# Patient Record
Sex: MALE | Race: WHITE
[De-identification: names, ages, dates, MRNs, and addresses within clinical notes are randomized per-mention and may not be internally consistent; named-entity substitution may affect disease eponyms.]

---

## 2019-07-01 ENCOUNTER — HOSPITAL ENCOUNTER (OUTPATIENT)
Dept: HOSPITAL 25 - OREAST | Age: 24
Discharge: HOME | End: 2019-07-01
Attending: ORTHOPAEDIC SURGERY
Payer: COMMERCIAL

## 2019-07-01 VITALS — DIASTOLIC BLOOD PRESSURE: 73 MMHG | SYSTOLIC BLOOD PRESSURE: 108 MMHG

## 2019-07-01 DIAGNOSIS — M75.41: ICD-10-CM
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
## 2019-07-01 DIAGNOSIS — Z72.0: ICD-10-CM

## 2019-07-01 DIAGNOSIS — S43.431A: Primary | ICD-10-CM
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
## 2019-07-01 DIAGNOSIS — G89.18: ICD-10-CM

## 2019-07-01 PROCEDURE — C1776 JOINT DEVICE (IMPLANTABLE): HCPCS

## 2019-07-01 NOTE — OP
CC:  PCP

 

OPERATIVE REPORT:

 

DATE OF OPERATION:  07/01/19

 

DATE OF BIRTH:  [birth year]

 

SURGEON:  Ryland Shah MD.

 

ASSISTANT:  SAMANTHA Padgett.

 

ANESTHESIOLOGIST:  Dr. Nj.

 

ANESTHESIA:  General interscalene block.

 

PREOPERATIVE DIAGNOSIS:  Right shoulder superior labral tear from anterior to posterior with paralabr
al cyst and subacromial impingement.

 

POSTOPERATIVE DIAGNOSIS:  Right shoulder superior labral tear from anterior to posterior with paralab
ral cyst and subacromial impingement.

 

OPERATIVE PROCEDURE:  Right shoulder arthroscopy with:

1.  Extensive glenohumeral debridement.

2.  Subacromial decompression with acromioplasty.

3.  Subpectoral biceps tenodesis.

4.  Subacromial injection with 80 mg Depo-Medrol.

 

COMPLICATIONS:  None.

 

ESTIMATED BLOOD LOSS:  Minimal.

 

IMPLANTS:  One 2.8 mm Q-Fix.

 

INDICATIONS:  German Dunn is a 23-year-old male with persistent right shoulder pain.  He moves and 
cuts trees for work.  He has had right-sided pain.  He denies numbness or tingling.  No fevers or chi
lls.  He has failed conservative management and elected to proceed with surgical treatment.  The risk
s and benefits of surgery were discussed at length included, but not limited to bleeding, infection, 
damage to nerves; vessels; surrounding structures, wound nonhealing, persistent pain, need for furthe
r surgery, scarring, stiffness, incomplete relief of symptoms, risk of anesthesia.

 

DESCRIPTION OF PROCEDURE:  The patient was greeted in the preoperative area by the attending surgeon.
  The correct extremity was marked and consent was confirmed.  He was placed in the supine position o
n the operating table and the patient underwent interscalene nerve block by the anesthesiologist, rafa
er which he was brought back to the operating suite.  He was placed in the supine position on the ope
rating table, then underwent general anesthesia with endotracheal intubation, after which he was plac
ed in the left lateral decubitus position.  All bony prominences were padded.  He was secured with a 
pegboard.  The right arm was then prepped and draped in the usual sterile fashion with chlorhexidine 
soap, scrub, and alcohol wipe and a final prep with ChloraPrep.

 

After appropriate surgical pause indicating side, site, procedure, and administration of antibiotics,
 the standard posterolateral portal was made sharply with an 11 blade.  The scope was introduced into
 the joint.  The joint was examined.  The glenohumeral joint had grade 0-1 changes.  There was frayin
g of the anterior and posterior labrum, but there was no jimbo tearing.  Inferior recess was intact. 
 There was no drive-through sign.  The subscap was intact.  The superior labrum was obviously torn an
d was unstable with a positive peel-back sign.  The biceps had a lot of erythema along the length.  T
he anterior portal was made in an outside-in fashion.  The biceps was then tenotomized for later teno
desis.  The shaver was used to debride back the anterior, posterior, superior labrum.  The undersurfa
ce of the supraspinatus tendon had some mild fraying as well.  This was debrided back using the shave
r.  Once the debridement was completed, attention was directed to the subacromial space.

 

With the scope positioned in the subacromial space, significant bursa was present. The shaver was use
d to debride back the subacromial space with a shaver, and the undersurface of the acromion was then 
skeletonized using electrocautery device, which revealed an anterolateral spur.  This was debrided ba
ck using 4.0 oval bur. Rotator cuff was visualized and found to be intact.  Hemostasis was obtained u
sing electrocautery device.  Once all loose debris was removed, an 18-gauge needle was placed under a
rthroscopic visualization for later subacromial injection.

 

The attention was directed to the biceps.  The anterior aspect was prepped again using ChloraPrep.  T
he 15 blade was used to make an incision in line with the biceps tendon.  Soft tissue was carefully d
issected to expose the fascia.  Once the type was identified, the remainder of the dissection was don
e bluntly.  The biceps was palpated through the groove and brought through the wound.  It had abundan
t synovitis as well as tendinosis.  The groove was prepared in the usual fashion using electrocautery
 device, red ball rasp, and osteotome.  The Q-Fix was then drilled using chloroquine and deployed wit
h excellent purchase.  The suture was then passed through the tendon in a Johan-Bar type configurat
ion.  The excess stub was excised and shuttled back to the wound.  The wounds were copiously irrigate
d with sterile saline.  Portals were closed with nylon.  The anterior wound was closed in layers with
 3-0 Monocryl.  Sterile dressings were applied as well as a Cryo/Cuff and UltraSling.  He was awoken 
from anesthesia and transferred to the PACU in stable condition.

 

POSTOPERATIVE PLAN:  He will be nonweightbearing in a sling for 4 weeks.  He will be discharged on pa
in medication and start therapy next week.  DVT prophylaxis was considered, but deferred due to no pr
evious personal or family history.  I will see the patient back in 10 to 14 days.

 

 [id number]/[facility] #: [id number]